# Patient Record
Sex: FEMALE | Race: WHITE | NOT HISPANIC OR LATINO | Employment: UNEMPLOYED | URBAN - METROPOLITAN AREA
[De-identification: names, ages, dates, MRNs, and addresses within clinical notes are randomized per-mention and may not be internally consistent; named-entity substitution may affect disease eponyms.]

---

## 2017-03-01 ENCOUNTER — DOCUMENTATION ONLY (OUTPATIENT)
Dept: INFUSION THERAPY | Facility: HOSPITAL | Age: 57
End: 2017-03-01

## 2017-03-01 NOTE — PROGRESS NOTES
: LCSW met with pt and spouse for social service aspect of chemo education. Provided overview of supportive services offered by [facility] and provided contact information. Pt presented as somewhat anxious and became tearful on several occasions. Pt states that the time leading up to treatment has been nerve wracking.  LCSW provided support and encouragement. LCSW provided pt and spouse with tour of infusion suite, answered questions and addressed concerns. Pt expressed gratitude. Pt scheduled to begin chemo on Friday of this week. LCSW to follow. Lilli Callejas LCSW

## 2017-03-03 ENCOUNTER — DOCUMENTATION ONLY (OUTPATIENT)
Dept: INFUSION THERAPY | Facility: HOSPITAL | Age: 57
End: 2017-03-03

## 2017-03-03 ENCOUNTER — INFUSION (OUTPATIENT)
Dept: INFUSION THERAPY | Facility: HOSPITAL | Age: 57
End: 2017-03-03
Attending: INTERNAL MEDICINE
Payer: COMMERCIAL

## 2017-03-03 VITALS
TEMPERATURE: 98 F | HEIGHT: 64 IN | WEIGHT: 128.63 LBS | RESPIRATION RATE: 16 BRPM | SYSTOLIC BLOOD PRESSURE: 121 MMHG | BODY MASS INDEX: 21.96 KG/M2 | DIASTOLIC BLOOD PRESSURE: 73 MMHG | HEART RATE: 80 BPM

## 2017-03-03 DIAGNOSIS — C50.911 CARCINOMA OF RIGHT BREAST: Primary | ICD-10-CM

## 2017-03-03 LAB
ALBUMIN SERPL BCP-MCNC: 4.4 G/DL
ALP SERPL-CCNC: 90 U/L
ALT SERPL W/O P-5'-P-CCNC: 36 U/L
ANION GAP SERPL CALC-SCNC: 9 MMOL/L
AST SERPL-CCNC: 24 U/L
BASOPHILS # BLD AUTO: 0.01 K/UL
BASOPHILS NFR BLD: 0.1 %
BILIRUB SERPL-MCNC: 0.5 MG/DL
BUN SERPL-MCNC: 17 MG/DL
CALCIUM SERPL-MCNC: 9.7 MG/DL
CHLORIDE SERPL-SCNC: 103 MMOL/L
CO2 SERPL-SCNC: 28 MMOL/L
CREAT SERPL-MCNC: 0.62 MG/DL
DIFFERENTIAL METHOD: ABNORMAL
EOSINOPHIL # BLD AUTO: 0 K/UL
EOSINOPHIL NFR BLD: 0 %
ERYTHROCYTE [DISTWIDTH] IN BLOOD BY AUTOMATED COUNT: 13.3 %
EST. GFR  (AFRICAN AMERICAN): >60 ML/MIN/1.73 M^2
EST. GFR  (NON AFRICAN AMERICAN): >60 ML/MIN/1.73 M^2
GLUCOSE SERPL-MCNC: 106 MG/DL
HCT VFR BLD AUTO: 36.6 %
HGB BLD-MCNC: 11.9 G/DL
LYMPHOCYTES # BLD AUTO: 1.5 K/UL
LYMPHOCYTES NFR BLD: 14.5 %
MAGNESIUM SERPL-MCNC: 2 MG/DL
MCH RBC QN AUTO: 29 PG
MCHC RBC AUTO-ENTMCNC: 32.5 %
MCV RBC AUTO: 89 FL
MONOCYTES # BLD AUTO: 0.7 K/UL
MONOCYTES NFR BLD: 6.6 %
NEUTROPHILS # BLD AUTO: 8 K/UL
NEUTROPHILS NFR BLD: 78.8 %
NRBC BLD-RTO: 0 /100 WBC
PLATELET # BLD AUTO: 301 K/UL
PMV BLD AUTO: 10.1 FL
POTASSIUM SERPL-SCNC: 4.1 MMOL/L
PROT SERPL-MCNC: 7.4 G/DL
RBC # BLD AUTO: 4.1 M/UL
SODIUM SERPL-SCNC: 140 MMOL/L
WBC # BLD AUTO: 10.15 K/UL

## 2017-03-03 PROCEDURE — 83735 ASSAY OF MAGNESIUM: CPT | Mod: PN

## 2017-03-03 PROCEDURE — 85025 COMPLETE CBC W/AUTO DIFF WBC: CPT

## 2017-03-03 PROCEDURE — 85025 COMPLETE CBC W/AUTO DIFF WBC: CPT | Mod: PN

## 2017-03-03 PROCEDURE — 25000003 PHARM REV CODE 250: Mod: PN | Performed by: INTERNAL MEDICINE

## 2017-03-03 PROCEDURE — 63600175 PHARM REV CODE 636 W HCPCS: Mod: PN | Performed by: INTERNAL MEDICINE

## 2017-03-03 PROCEDURE — 80053 COMPREHEN METABOLIC PANEL: CPT

## 2017-03-03 PROCEDURE — 83735 ASSAY OF MAGNESIUM: CPT

## 2017-03-03 PROCEDURE — 80053 COMPREHEN METABOLIC PANEL: CPT | Mod: PN

## 2017-03-03 PROCEDURE — 96413 CHEMO IV INFUSION 1 HR: CPT | Mod: PN

## 2017-03-03 PROCEDURE — 96417 CHEMO IV INFUS EACH ADDL SEQ: CPT | Mod: PN

## 2017-03-03 PROCEDURE — 96377 APPLICATON ON-BODY INJECTOR: CPT | Mod: PN

## 2017-03-03 PROCEDURE — 96367 TX/PROPH/DG ADDL SEQ IV INF: CPT | Mod: PN

## 2017-03-03 RX ORDER — HEPARIN 100 UNIT/ML
500 SYRINGE INTRAVENOUS
Status: DISCONTINUED | OUTPATIENT
Start: 2017-03-03 | End: 2017-03-03 | Stop reason: HOSPADM

## 2017-03-03 RX ORDER — SODIUM CHLORIDE 0.9 % (FLUSH) 0.9 %
10 SYRINGE (ML) INJECTION
Status: DISCONTINUED | OUTPATIENT
Start: 2017-03-03 | End: 2017-03-03 | Stop reason: HOSPADM

## 2017-03-03 RX ADMIN — CYCLOPHOSPHAMIDE 980 MG: 1 INJECTION, POWDER, FOR SOLUTION INTRAVENOUS; ORAL at 11:03

## 2017-03-03 RX ADMIN — SODIUM CHLORIDE 150 MG: 9 INJECTION, SOLUTION INTRAVENOUS at 09:03

## 2017-03-03 RX ADMIN — PALONOSETRON HYDROCHLORIDE 0.25 MG: 0.25 INJECTION INTRAVENOUS at 09:03

## 2017-03-03 RX ADMIN — PEGFILGRASTIM 6 MG: KIT SUBCUTANEOUS at 01:03

## 2017-03-03 RX ADMIN — SODIUM CHLORIDE: 0.9 INJECTION, SOLUTION INTRAVENOUS at 09:03

## 2017-03-03 RX ADMIN — SODIUM CHLORIDE, PRESERVATIVE FREE 10 ML: 5 INJECTION INTRAVENOUS at 01:03

## 2017-03-03 RX ADMIN — HEPARIN 500 UNITS: 100 SYRINGE at 01:03

## 2017-03-03 RX ADMIN — DOCETAXEL 120 MG: 20 INJECTION, SOLUTION, CONCENTRATE INTRAVENOUS at 10:03

## 2017-03-03 NOTE — PROGRESS NOTES
Nutrition: Met with pt and pts  today for chemo new patient education in the chair. Pt did not complete a nutrition screen however she is not currently a nutritional risk. Pt is eating well. PT reports minor weight gain due to not being able to exersice due to having surgery. Pt informed me that she has a good appetite. Reports eating a well varried diet. Wt: 128# Discussed the importance of weight maintenance. Discussed the importance of eaitng protein rich foods. Discussed the importance of eaitng small frequent meals. Discussed mouth care, taste changes, nausea and vomiting, and food safety. Pt verbalized understanding of discussed information. Will follow up at cycle 2. Karis Zavala,MS, RD, LDN

## 2017-03-03 NOTE — PLAN OF CARE
Problem: Patient Care Overview  Goal: Plan of Care Review  Outcome: Ongoing (interventions implemented as appropriate)  Tolerated 1st chemo infusion without any difficulty; RTC in 3 weeks for next tx; Amb off unit independently with spouse

## 2017-03-03 NOTE — MR AVS SNAPSHOT
Patient Information     Patient Name Sex Micheline Hernandez Female 1960      Visit Information        Provider Department [facility] Phone [facility]    3/3/2017 9:30 AM CHAIR PAWEL Pettit [facility] [phone] [facility]      Patient Instructions    Take zofran (ondansetron)  every 8 hours and compazine (prochlorperazine) every 6 hours.  You can take zofran and compazine within an hour or 2 of each other but zofran must be 6-7 hours after zofran and compazine must be 5-6 hours after compazine.  In other words, do not take the same drug too close together.          Resources for People with Cancer    You cant fight cancer alone. Reach out. Seek support from family, friends, and others who care about you. Let other people assist you. It can help you feel better both during and after your treatment.  Support groups  When you have cancer, support groups can be a great help. Meeting and talking with others with cancer can help you cope. There are also support groups for families of people with cancer. To find a support group, start by talking to your hospitals patient education department. Ask your healthcare provider. You can also do a search for cancer support groups on the Internet.  For more information  Contact the sources below for more information about cancer and cancer support groups:  · American Cancer Society (ACS)  140.938.4927  www.cancer.org  · National Cancer Onemo  871.731.8573  www.cancer.gov  Local resources  Ask your healthcare provider about your local ACS or other support groups:  _____________________________________________________________________  _____________________________________________________________________  _____________________________________________________________________  Date Last Reviewed: 2016  © 0744-1182 [employer]. [address] [place] [address]. All rights reserved. This information is not intended as  "a substitute for professional medical care. Always follow your healthcare professional's instructions.        How to Control Nausea and Vomiting     Taken before meals, medicines can help ease nausea.    Nausea is feeling that you need to throw up. Throwing up occurs when your body forces food that is in your stomach out through your mouth. Nausea and vomiting are symptoms that are caused by many things. They can happen when a condition or disease, medicine, medical treatment, or a poisonous substance affects the area in your brain that controls vomiting. Some conditions or diseases can cause nausea, abdominal pain or cramps, and vomiting. The symptoms can be mild and go away by themselves. Other symptoms can be serious. You will need to see your healthcare provider for these.  Nausea and vomiting are common. They can be caused by many things. These include:  · "Stomach flu" (gastroenteritis)  · Food poisoning  · Stomach pain (gastritis)  · Blockages  They can also be caused by a head injury, an infection in the brain or inside the ear, or migraines. Other common causes of nausea and vomiting include:  · Brain tumor  · Brain bruise  · Motion sickness  · Drugs. These include alcohol, pain medicines such as morphine, and cancer medicines.  · Toxins. These are poisonous things like plants or liquids that are swallowed by accident.  · Advanced types of cancer  · Movement problems (psychogenic problems)  · Extra pressure in the fluid that surrounds the brain and spinal cord (elevated intracranial pressure)     Nausea and vomiting are also common side effects of chemotherapy and radiation therapy. Side effects happen when treatment changes some normal cells as well as cancer cells. In this case, the cells lining your stomach and the part of your brain that controls vomiting are affected. Other more serious causes of vomiting may be hard to find early in the illness.     When to seek medical advice  Call your healthcare " provider right away if you have the following:  · Nausea or vomiting that lasts 24 hours or more  · Trouble keeping fluids down   Medicines can help  Nausea or vomiting can often be prevented or controlled with medicines (antiemetics). Your doctor may give you antiemetics before or after treatment if you are getting chemotherapy or other medical treatments that cause nausea or vomiting.  Eating tips  · If you have medicines to control nausea, take them before meals as directed.  · Avoid fatty or greasy foods while nauseated.  · Eat small meals slowly throughout the day.  · Ask someone to sit with you while you eat to keep you from thinking about feeling nauseated.  · Eat foods at room temperature or colder to avoid strong smells.  · Eat dry foods, such as toast, crackers, or pretzels. Also eat cool, light foods, such as applesauce, and bland foods, such as oatmeal or skinned chicken.   Other ways to feel better  · Get a little fresh air. Take a short walk.  · Talk to a friend, listen to music, or watch TV.  · Take a few deep, slow breaths.  · Eat by candlelight or in surroundings that you find relaxing.  · Use a technique, such as guided imagery, to help you relax. Imagine yourself in a beautiful, restful scene. Or daydream about the place youd most like to be.  Date Last Reviewed: 1/6/2016  © 0847-6905 [employer]. [address] [place] [address], [place] [address]. All rights reserved. This information is not intended as a substitute for professional medical care. Always follow your healthcare professional's instructions.        Preventing Falls in the Home  As you get older, falls are more likely for many reasons. One reason is because your reaction time slows. Your muscles and joints may also get stiffer, making them less flexible and therefore more prone to injury. Illness, medicines, and vision changes can also affect your balance. This increases your risk of falling. A fall could leave you unable to  live on your own. To make your home safer, follow these tips:   Floors  Make floors safer by doing the following:   · Put nonskid pads under area rugs.  · Remove throw rugs.  · Replace worn floor coverings.  · Tack carpets firmly to each step on carpeted stairs. Put nonskid strips on the edges of uncarpeted stairs.  · Keep floors and stairs free of clutter and cords.  · Arrange furniture so there are clear pathways.  · Clean up any spills right away.  Bathrooms  Make bathrooms safer by doing the following:   ·   Install grab bars in the tub or shower.  · Apply nonskid strips or put a nonskid rubber mat in the tub or shower.  · Sit on a bath chair to bathe.  · Use bathmats with nonskid backing.  Lighting  Tips to light your way:   · Keep a flashlight in each room.  · Put a nightlight along the pathway between the bedroom and the bathroom.  Date Last Reviewed: 8/1/2016  © 3653-7741 [employer]. [address] [place] [address]. All rights reserved. This information is not intended as a substitute for professional medical care. Always follow your healthcare professional's instructions.        Oncology: Controlling Diarrhea  Diarrhea (loose stools) is a common side effect of chemotherapy and radiation therapy. Diarrhea results when treatment affects the normal cells lining the intestine. To help limit this problem, try the tips on this handout.     For breakfast, try eating toasted white bread and a banana.   Tips for controlling diarrhea  · Limit the amount of fiber in your diet. Avoid high-fiber foods such as whole-grain bread and brown rice. Instead, eat white bread and rice.  · Eat foods rich in potassium such as bananas and oranges. This can help replenish electrolytes lost due to diarrhea.  · Eat small, frequent meals.  · Drink plenty of fluids. Clear liquids and flat light sodas, such as ginger ale, are best. This can help replenish fluids lost due to diarrhea, and prevent  dehydration.  · Do not drink coffee, tea, or alcohol.  · Try not to eat foods that are fried, greasy, spicy, or sweet.  · Limit the amount of milk you drink.  Medicines can help  Ongoing diarrhea is not something you have to live with. Talk with your doctor. Medicine to stop diarrhea may be prescribed. Your doctor may also suggest using an ointment to soothe irritation. Keeping the anal area clean helps as well. Do not take any over-the-counter product without asking your doctor first.  When to see your healthcare provider  See your healthcare provider if any of the following occur:  · The diarrhea lasts more than 48 hours.  · There is blood in your stool.  · You have pain in your abdomen.   Date Last Reviewed: 1/3/2016  © 3792-4664 [employer]. [address] [place] [address], [place] [address]. All rights reserved. This information is not intended as a substitute for professional medical care. Always follow your healthcare professional's instructions.        Oncology: Controlling Constipation  Constipation (difficulty passing stool) is a common side effect of chemotherapy and pain medicine. Constipation can be caused by other medications you are taking. It can also occur if youre not getting enough exercise and nourishment. Fluids are important in managing constipation.    3 steps to help treat constipation  Step 1. Drink plenty of fluids  Water, prune juice, and warm drinks are good choices.  Step 2. Eat high-fiber foods  Whole grains, fruit, and vegetables all can help prevent constipation, by increasing stool bulk, making it easier to pass.  Step 3. Exercise often  Taking a short walk each day is a good way to start. Check with your doctor before starting an exercise program.  When to see your healthcare provider  Contact your doctor if:  · You have no bowel movement in 3 days or more, especially if you are taking vinca alkaloids (vinblastine, vincristine, or vinorelbine).  · You are vomiting.  · You have  pain in your abdomen.   Medicines can also help  Ask your doctor about the medicines used to manage constipation. Your provider may prescribe a stool softener or laxative. These can relieve constipation by helping you have a bowel movement. Don't take any over-the-counter product without asking your doctor first.  Date Last Reviewed: 1/3/2016  © 4059-4088 [employer]. [address] [place] [address], [place] [address]. All rights reserved. This information is not intended as a substitute for professional medical care. Always follow your healthcare professional's instructions.        Managing Fatigue     Family members can help with meals and chores around the house.   Fatigue is common. It can be caused by worry, lack of sleep, or poor appetite. Fatigue can also be a sign of anemia, a shortage of red blood cells. You might need medical treatment for anemia. The tips below can help you feel better.  Conserving energy  · Keep track of the times of day when you are most tired and plan around them. For instance, if you are more tired in the afternoon, try to get tasks done in the morning.  · Decide which tasks are most important. Do those first.  · Pass tasks along to others when you need to. Ask for help.  · Accept help when its offered. Tell people what they can do to help. For instance, you may need someone to fix a meal, fold clothes, or put gas in your car.  · Plan rest times. You may want to take a nap each day. Just sitting quietly for a few minutes can make you feel more rested.  What you can do to feel better  · Relax before you try to sleep. Take a bath or read for a while.  · Form a sleep pattern. Go to bed at the same time each night and get up at the same time each morning.  · Eat well. Choose foods from all of the food groups each day.  · Exercise. Take a brisk walk to help increase your energy.  · Avoid caffeine and alcohol. Drink plenty of water or fruit juices instead.  Treating anemia  If you  begin to feel more tired than normal, tell your doctor. Fatigue could be a sign of anemia. This problem is fairly common in cancer patients, especially during chemotherapy and radiation treatments. If your red blood cell count is too low, you may get a blood transfusion. In some cases, you may need medicine to increase the number of red blood cells your body makes.  When to call your healthcare provider  Call your healthcare provider if you have:  · Shortness of breath or chest pain  · A dizzy feeling when you get up from lying or sitting down  · Paler skin than normal  · Extreme tiredness that is not helped by sleep   Date Last Reviewed: 1/3/2016  © 7045-4995 [employer]. [address] [place] [address], [place] [address]. All rights reserved. This information is not intended as a substitute for professional medical care. Always follow your healthcare professional's instructions.             Your Current Medications Are     cefadroxil (DURICEF) 500 MG Cap    dexamethasone (DECADRON) 4 MG Tab    hydrocodone-acetaminophen 10-325mg (NORCO)  mg Tab    lidocaine-prilocaine (EMLA) cream    lorazepam (ATIVAN) 0.5 MG tablet    mupirocin calcium 2% nasl oint (BACTROBAN) 2 % Oint    netupitant-palonosetron 300-0.5 mg Cap    ondansetron (ZOFRAN-ODT) 8 MG TbDL    prochlorperazine (COMPAZINE) 10 MG tablet    tramadol (ULTRAM) 50 mg tablet      Facility-Administered Medications     alteplase injection 2 mg    cyclophosphamide (CYTOXAN) 600 mg/m2 = 980 mg in sodium chloride 0.9% 250 mL chemo infusion    docetaxel (TAXOTERE) 75 mg/m2 = 120 mg in sodium chloride 0.9% 250 mL chemo infusion    fosaprepitant 150 mg in sodium chloride 0.9% 150 mL IVPB    heparin, porcine (PF) 100 unit/mL injection flush 500 Units    palonosetron 0.25mg/dexamethasone 10mg IVPB    pegfilgrastim (NEULASTA (ON BODY INJECTOR)) injection 6 mg    sodium chloride 0.9% 250 mL flush bag    sodium chloride 0.9% flush 10 mL      Appointments for Next  Year     3/6/2017  3:30 PM INFUSION 030 MIN (30 min.) [facility] CHAIR 10, [facility]    Arrive at check-in approximately 15 minutes before your scheduled appointment time. Bring all outside medical records and imaging, along with a list of your current medications and insurance card.    1st Floor    3/10/2017  2:30 PM ESTABLISHED PATIENT (30 min.) [facility] ZEN Bazan    Arrive at check-in approximately 15 minutes before your scheduled appointment time. Bring all outside medical records and imaging, along with a list of your current medications and insurance card.    3/16/2017 10:00 AM ESTABLISHED PATIENT (30 min.) [facility] ZEN Bazan    Arrive at check-in approximately 15 minutes before your scheduled appointment time. Bring all outside medical records and imaging, along with a list of your current medications and insurance card.    3/22/2017  9:15 AM ESTABLISHED PATIENT (15 min.) [facility] Corby Griffiths MD    Arrive at check-in approximately 15 minutes before your scheduled appointment time. Bring all outside medical records and imaging, along with a list of your current medications and insurance card.    3/22/2017  9:30 AM INFUSION 300 MIN (300 min.) [facility] CHAIR 26, [facility]    Arrive at check-in approximately 15 minutes before your scheduled appointment time. Bring all outside medical records and imaging, along with a list of your current medications and insurance card.    1st Floor    3/23/2017  2:30 PM INFUSION 030 MIN (30 min.) [facility] CHAIR 14, [facility]    Arrive at check-in approximately 15 minutes before your scheduled appointment time. Bring all outside medical records and imaging, along with a list of your current medications and insurance card.    1st Floor         Default Flowsheet Data (last 24 hours)      Amb  "Complex Vitals [facility]        03/03/17 1331 03/03/17 0848             Measurements    Weight  58.3 kg (128 lb 9.6 oz)       Height  5' 4" (1.626 m)       BSA (Calculated - sq m)  1.62 sq meters       BMI (Calculated)  22.1       /73 117/73       Temp  98.2 °F (36.8 °C)       Pulse 80 77       Resp  16       Pain Assessment    Pain Score  Zero               Allergies     No Known Allergies      Medications You Received from 03/02/2017 1333 to 03/03/2017 1333        Date/Time Order Dose Route Action     03/03/2017 1152 cyclophosphamide (CYTOXAN) 600 mg/m2 = 980 mg in sodium chloride 0.9% 250 mL chemo infusion 980 mg Intravenous New Bag     03/03/2017 1039 docetaxel (TAXOTERE) 75 mg/m2 = 120 mg in sodium chloride 0.9% 250 mL chemo infusion 120 mg Intravenous New Bag     03/03/2017 0950 fosaprepitant 150 mg in sodium chloride 0.9% 150 mL IVPB 150 mg Intravenous New Bag     03/03/2017 1330 heparin, porcine (PF) 100 unit/mL injection flush 500 Units 500 Units Intravenous Given     03/03/2017 0910 palonosetron 0.25mg/dexamethasone 10mg IVPB 0.25 mg Intravenous New Bag     03/03/2017 1300 pegfilgrastim (NEULASTA (ON BODY INJECTOR)) injection 6 mg 6 mg Subcutaneous Given     03/03/2017 0910 sodium chloride 0.9% 250 mL flush bag   Intravenous New Bag     03/03/2017 1330 sodium chloride 0.9% flush 10 mL 10 mL Intravenous Given      Current Discharge Medication List     Cannot display discharge medications since this is not an admission.      "

## 2017-03-03 NOTE — PATIENT INSTRUCTIONS
Take zofran (ondansetron)  every 8 hours and compazine (prochlorperazine) every 6 hours.  You can take zofran and compazine within an hour or 2 of each other but zofran must be 6-7 hours after zofran and compazine must be 5-6 hours after compazine.  In other words, do not take the same drug too close together.          Resources for People with Cancer    You cant fight cancer alone. Reach out. Seek support from family, friends, and others who care about you. Let other people assist you. It can help you feel better both during and after your treatment.  Support groups  When you have cancer, support groups can be a great help. Meeting and talking with others with cancer can help you cope. There are also support groups for families of people with cancer. To find a support group, start by talking to your hospitals patient education department. Ask your healthcare provider. You can also do a search for cancer support groups on the Internet.  For more information  Contact the sources below for more information about cancer and cancer support groups:  · American Cancer Society (ACS)  533.708.5919  www.cancer.org  · National Cancer Axis  627.508.2467  www.cancer.gov  Local resources  Ask your healthcare provider about your local ACS or other support groups:  _____________________________________________________________________  _____________________________________________________________________  _____________________________________________________________________  Date Last Reviewed: 1/6/2016  © 7398-4590 [employer]. [address] [place] [address], [place] [address]. All rights reserved. This information is not intended as a substitute for professional medical care. Always follow your healthcare professional's instructions.        How to Control Nausea and Vomiting     Taken before meals, medicines can help ease nausea.    Nausea is feeling that you need to throw up. Throwing up occurs when your body  "forces food that is in your stomach out through your mouth. Nausea and vomiting are symptoms that are caused by many things. They can happen when a condition or disease, medicine, medical treatment, or a poisonous substance affects the area in your brain that controls vomiting. Some conditions or diseases can cause nausea, abdominal pain or cramps, and vomiting. The symptoms can be mild and go away by themselves. Other symptoms can be serious. You will need to see your healthcare provider for these.  Nausea and vomiting are common. They can be caused by many things. These include:  · "Stomach flu" (gastroenteritis)  · Food poisoning  · Stomach pain (gastritis)  · Blockages  They can also be caused by a head injury, an infection in the brain or inside the ear, or migraines. Other common causes of nausea and vomiting include:  · Brain tumor  · Brain bruise  · Motion sickness  · Drugs. These include alcohol, pain medicines such as morphine, and cancer medicines.  · Toxins. These are poisonous things like plants or liquids that are swallowed by accident.  · Advanced types of cancer  · Movement problems (psychogenic problems)  · Extra pressure in the fluid that surrounds the brain and spinal cord (elevated intracranial pressure)     Nausea and vomiting are also common side effects of chemotherapy and radiation therapy. Side effects happen when treatment changes some normal cells as well as cancer cells. In this case, the cells lining your stomach and the part of your brain that controls vomiting are affected. Other more serious causes of vomiting may be hard to find early in the illness.     When to seek medical advice  Call your healthcare provider right away if you have the following:  · Nausea or vomiting that lasts 24 hours or more  · Trouble keeping fluids down   Medicines can help  Nausea or vomiting can often be prevented or controlled with medicines (antiemetics). Your doctor may give you antiemetics before or " after treatment if you are getting chemotherapy or other medical treatments that cause nausea or vomiting.  Eating tips  · If you have medicines to control nausea, take them before meals as directed.  · Avoid fatty or greasy foods while nauseated.  · Eat small meals slowly throughout the day.  · Ask someone to sit with you while you eat to keep you from thinking about feeling nauseated.  · Eat foods at room temperature or colder to avoid strong smells.  · Eat dry foods, such as toast, crackers, or pretzels. Also eat cool, light foods, such as applesauce, and bland foods, such as oatmeal or skinned chicken.   Other ways to feel better  · Get a little fresh air. Take a short walk.  · Talk to a friend, listen to music, or watch TV.  · Take a few deep, slow breaths.  · Eat by candlelight or in surroundings that you find relaxing.  · Use a technique, such as guided imagery, to help you relax. Imagine yourself in a beautiful, restful scene. Or daydream about the place youd most like to be.  Date Last Reviewed: 1/6/2016  © 3421-5439 [employer]. [address] [place] [address]. All rights reserved. This information is not intended as a substitute for professional medical care. Always follow your healthcare professional's instructions.        Preventing Falls in the Home  As you get older, falls are more likely for many reasons. One reason is because your reaction time slows. Your muscles and joints may also get stiffer, making them less flexible and therefore more prone to injury. Illness, medicines, and vision changes can also affect your balance. This increases your risk of falling. A fall could leave you unable to live on your own. To make your home safer, follow these tips:   Floors  Make floors safer by doing the following:   · Put nonskid pads under area rugs.  · Remove throw rugs.  · Replace worn floor coverings.  · Tack carpets firmly to each step on carpeted stairs. Put nonskid strips on  the edges of uncarpeted stairs.  · Keep floors and stairs free of clutter and cords.  · Arrange furniture so there are clear pathways.  · Clean up any spills right away.  Bathrooms  Make bathrooms safer by doing the following:   ·   Install grab bars in the tub or shower.  · Apply nonskid strips or put a nonskid rubber mat in the tub or shower.  · Sit on a bath chair to bathe.  · Use bathmats with nonskid backing.  Lighting  Tips to light your way:   · Keep a flashlight in each room.  · Put a nightlight along the pathway between the bedroom and the bathroom.  Date Last Reviewed: 8/1/2016 © 2000-2016 [employer]. [address] [place] [address], [place] [address]. All rights reserved. This information is not intended as a substitute for professional medical care. Always follow your healthcare professional's instructions.        Oncology: Controlling Diarrhea  Diarrhea (loose stools) is a common side effect of chemotherapy and radiation therapy. Diarrhea results when treatment affects the normal cells lining the intestine. To help limit this problem, try the tips on this handout.     For breakfast, try eating toasted white bread and a banana.   Tips for controlling diarrhea  · Limit the amount of fiber in your diet. Avoid high-fiber foods such as whole-grain bread and brown rice. Instead, eat white bread and rice.  · Eat foods rich in potassium such as bananas and oranges. This can help replenish electrolytes lost due to diarrhea.  · Eat small, frequent meals.  · Drink plenty of fluids. Clear liquids and flat light sodas, such as ginger ale, are best. This can help replenish fluids lost due to diarrhea, and prevent dehydration.  · Do not drink coffee, tea, or alcohol.  · Try not to eat foods that are fried, greasy, spicy, or sweet.  · Limit the amount of milk you drink.  Medicines can help  Ongoing diarrhea is not something you have to live with. Talk with your doctor. Medicine to stop diarrhea may be  prescribed. Your doctor may also suggest using an ointment to soothe irritation. Keeping the anal area clean helps as well. Do not take any over-the-counter product without asking your doctor first.  When to see your healthcare provider  See your healthcare provider if any of the following occur:  · The diarrhea lasts more than 48 hours.  · There is blood in your stool.  · You have pain in your abdomen.   Date Last Reviewed: 1/3/2016  © 6190-7429 [employer]. [address] [place] [address], [place] [address]. All rights reserved. This information is not intended as a substitute for professional medical care. Always follow your healthcare professional's instructions.        Oncology: Controlling Constipation  Constipation (difficulty passing stool) is a common side effect of chemotherapy and pain medicine. Constipation can be caused by other medications you are taking. It can also occur if youre not getting enough exercise and nourishment. Fluids are important in managing constipation.    3 steps to help treat constipation  Step 1. Drink plenty of fluids  Water, prune juice, and warm drinks are good choices.  Step 2. Eat high-fiber foods  Whole grains, fruit, and vegetables all can help prevent constipation, by increasing stool bulk, making it easier to pass.  Step 3. Exercise often  Taking a short walk each day is a good way to start. Check with your doctor before starting an exercise program.  When to see your healthcare provider  Contact your doctor if:  · You have no bowel movement in 3 days or more, especially if you are taking vinca alkaloids (vinblastine, vincristine, or vinorelbine).  · You are vomiting.  · You have pain in your abdomen.   Medicines can also help  Ask your doctor about the medicines used to manage constipation. Your provider may prescribe a stool softener or laxative. These can relieve constipation by helping you have a bowel movement. Don't take any over-the-counter product without  asking your doctor first.  Date Last Reviewed: 1/3/2016  © 8365-2419 [employer]. [address] [place] [address], [place] [address]. All rights reserved. This information is not intended as a substitute for professional medical care. Always follow your healthcare professional's instructions.        Managing Fatigue     Family members can help with meals and chores around the house.   Fatigue is common. It can be caused by worry, lack of sleep, or poor appetite. Fatigue can also be a sign of anemia, a shortage of red blood cells. You might need medical treatment for anemia. The tips below can help you feel better.  Conserving energy  · Keep track of the times of day when you are most tired and plan around them. For instance, if you are more tired in the afternoon, try to get tasks done in the morning.  · Decide which tasks are most important. Do those first.  · Pass tasks along to others when you need to. Ask for help.  · Accept help when its offered. Tell people what they can do to help. For instance, you may need someone to fix a meal, fold clothes, or put gas in your car.  · Plan rest times. You may want to take a nap each day. Just sitting quietly for a few minutes can make you feel more rested.  What you can do to feel better  · Relax before you try to sleep. Take a bath or read for a while.  · Form a sleep pattern. Go to bed at the same time each night and get up at the same time each morning.  · Eat well. Choose foods from all of the food groups each day.  · Exercise. Take a brisk walk to help increase your energy.  · Avoid caffeine and alcohol. Drink plenty of water or fruit juices instead.  Treating anemia  If you begin to feel more tired than normal, tell your doctor. Fatigue could be a sign of anemia. This problem is fairly common in cancer patients, especially during chemotherapy and radiation treatments. If your red blood cell count is too low, you may get a blood transfusion. In some cases, you  may need medicine to increase the number of red blood cells your body makes.  When to call your healthcare provider  Call your healthcare provider if you have:  · Shortness of breath or chest pain  · A dizzy feeling when you get up from lying or sitting down  · Paler skin than normal  · Extreme tiredness that is not helped by sleep   Date Last Reviewed: 1/3/2016  © 6339-0509 [employer]. [address] [place] [address], [place] [address]. All rights reserved. This information is not intended as a substitute for professional medical care. Always follow your healthcare professional's instructions.

## 2017-03-22 ENCOUNTER — INFUSION (OUTPATIENT)
Dept: INFUSION THERAPY | Facility: HOSPITAL | Age: 57
End: 2017-03-22
Attending: INTERNAL MEDICINE
Payer: COMMERCIAL

## 2017-03-22 ENCOUNTER — TELEPHONE (OUTPATIENT)
Dept: INFUSION THERAPY | Facility: HOSPITAL | Age: 57
End: 2017-03-22

## 2017-03-22 VITALS
HEART RATE: 86 BPM | RESPIRATION RATE: 16 BRPM | SYSTOLIC BLOOD PRESSURE: 126 MMHG | WEIGHT: 132.19 LBS | TEMPERATURE: 98 F | BODY MASS INDEX: 22.69 KG/M2 | DIASTOLIC BLOOD PRESSURE: 68 MMHG

## 2017-03-22 DIAGNOSIS — C50.911 CARCINOMA OF RIGHT BREAST: Primary | ICD-10-CM

## 2017-03-22 PROCEDURE — 25000003 PHARM REV CODE 250: Mod: PN | Performed by: NURSE PRACTITIONER

## 2017-03-22 PROCEDURE — 63600175 PHARM REV CODE 636 W HCPCS: Mod: PN | Performed by: NURSE PRACTITIONER

## 2017-03-22 PROCEDURE — 96413 CHEMO IV INFUSION 1 HR: CPT | Mod: PN

## 2017-03-22 PROCEDURE — 96377 APPLICATON ON-BODY INJECTOR: CPT | Mod: PN

## 2017-03-22 PROCEDURE — 96417 CHEMO IV INFUS EACH ADDL SEQ: CPT | Mod: PN

## 2017-03-22 PROCEDURE — 96367 TX/PROPH/DG ADDL SEQ IV INF: CPT | Mod: PN

## 2017-03-22 RX ORDER — SODIUM CHLORIDE 0.9 % (FLUSH) 0.9 %
10 SYRINGE (ML) INJECTION
Status: DISCONTINUED | OUTPATIENT
Start: 2017-03-22 | End: 2017-03-22 | Stop reason: HOSPADM

## 2017-03-22 RX ORDER — HEPARIN 100 UNIT/ML
500 SYRINGE INTRAVENOUS
Status: DISCONTINUED | OUTPATIENT
Start: 2017-03-22 | End: 2017-03-22 | Stop reason: HOSPADM

## 2017-03-22 RX ADMIN — PEGFILGRASTIM 6 MG: KIT SUBCUTANEOUS at 12:03

## 2017-03-22 RX ADMIN — CYCLOPHOSPHAMIDE 980 MG: 1 INJECTION, POWDER, FOR SOLUTION INTRAVENOUS; ORAL at 11:03

## 2017-03-22 RX ADMIN — DOCETAXEL 120 MG: 20 INJECTION, SOLUTION, CONCENTRATE INTRAVENOUS at 10:03

## 2017-03-22 RX ADMIN — SODIUM CHLORIDE: 0.9 INJECTION, SOLUTION INTRAVENOUS at 09:03

## 2017-03-22 RX ADMIN — SODIUM CHLORIDE, PRESERVATIVE FREE 10 ML: 5 INJECTION INTRAVENOUS at 09:03

## 2017-03-22 RX ADMIN — SODIUM CHLORIDE 150 MG: 9 INJECTION, SOLUTION INTRAVENOUS at 10:03

## 2017-03-22 RX ADMIN — PALONOSETRON HYDROCHLORIDE 0.25 MG: 0.25 INJECTION INTRAVENOUS at 09:03

## 2017-03-22 NOTE — MR AVS SNAPSHOT
Patient Information     Patient Name Sex Micheline Hernandez Female 1960      Visit Information        Provider Department Dep Phone Center    3/22/2017 9:30 AM CHAIR 14, [facility] [phone] [facility]      Patient Instructions     None      Your Current Medications Are     cefadroxil (DURICEF) 500 MG Cap    dexamethasone (DECADRON) 4 MG Tab    hydrocodone-acetaminophen 10-325mg (NORCO)  mg Tab    lidocaine-prilocaine (EMLA) cream    lorazepam (ATIVAN) 0.5 MG tablet    mupirocin calcium 2% nasl oint (BACTROBAN) 2 % Oint    netupitant-palonosetron 300-0.5 mg Cap    ondansetron (ZOFRAN-ODT) 8 MG TbDL    prochlorperazine (COMPAZINE) 10 MG tablet    tramadol (ULTRAM) 50 mg tablet      Facility-Administered Medications     alteplase injection 2 mg    cyclophosphamide (CYTOXAN) 600 mg/m2 = 980 mg in sodium chloride 0.9% 250 mL chemo infusion    docetaxel (TAXOTERE) 75 mg/m2 = 120 mg in sodium chloride 0.9% 250 mL chemo infusion    fosaprepitant 150 mg in sodium chloride 0.9% 150 mL IVPB    heparin, porcine (PF) 100 unit/mL injection flush 500 Units    palonosetron 0.25mg/dexamethasone 10mg IVPB    pegfilgrastim (NEULASTA (ON BODY INJECTOR)) injection 6 mg    sodium chloride 0.9% 250 mL flush bag    sodium chloride 0.9% flush 10 mL      Appointments for Next Year     2017  9:30 AM INFUSION 300 MIN (300 min.) [facility]    Arrive at check-in approximately 15 minutes before your scheduled appointment time. Bring all outside medical records and imaging, along with a list of your current medications and insurance card.    1st Floor    5/3/2017  9:30 AM INFUSION 300 MIN (300 min.) [facility] CHAIR 06, [facility] CHEMO    Arrive at check-in approximately 15 minutes before your scheduled appointment time. Bring all outside medical records and imaging, along with a list of your current medications and  insurance card.    1st Floor         Default Flowsheet Data (last 24 hours)      Amb Complex Vitals Bebeto        03/22/17 1300 03/22/17 0930 03/22/17 0930 03/22/17 0922       Measurements    Weight   59.9 kg (132 lb) 60 kg (132 lb 3.2 oz)     /68  127/60 127/60     Temp 97.9 °F (36.6 °C)  97.9 °F (36.6 °C) 97.9 °F (36.6 °C)     Pulse 86  89 89     Resp 16  16 16     Pain Assessment    Pain Score  Zero               Allergies     No Known Allergies      Medications You Received from 03/21/2017 1305 to 03/22/2017 1305        Date/Time Order Dose Route Action     03/22/2017 1140 cyclophosphamide (CYTOXAN) 600 mg/m2 = 980 mg in sodium chloride 0.9% 250 mL chemo infusion 980 mg Intravenous New Bag     03/22/2017 1038 docetaxel (TAXOTERE) 75 mg/m2 = 120 mg in sodium chloride 0.9% 250 mL chemo infusion 120 mg Intravenous New Bag     03/22/2017 1003 fosaprepitant 150 mg in sodium chloride 0.9% 150 mL IVPB 150 mg Intravenous New Bag     03/22/2017 0943 palonosetron 0.25mg/dexamethasone 10mg IVPB 0.25 mg Intravenous New Bag     03/22/2017 1255 pegfilgrastim (NEULASTA (ON BODY INJECTOR)) injection 6 mg 6 mg Subcutaneous Given     03/22/2017 0943 sodium chloride 0.9% 250 mL flush bag   Intravenous New Bag     03/22/2017 0943 sodium chloride 0.9% flush 10 mL 10 mL Intravenous Given      Current Discharge Medication List     Cannot display discharge medications since this is not an admission.

## 2017-03-22 NOTE — PLAN OF CARE
Problem: Patient Care Overview  Goal: Plan of Care Review  Outcome: Ongoing (interventions implemented as appropriate)  Pt tolerated chemotherapy infusion without any difficulty. Instructed pt to call for questions or concerns and to return to clinic as directed for next treatment. Pt verbalized understanding. AVS printed and schedule given to pt

## 2017-03-22 NOTE — TELEPHONE ENCOUNTER
needs f/u with alvaro  Received: Today       DIDIER Cook       Cc: Aziza Watson MA; Payton Rod RN; Alma Delia Liu RN                     Pt saw dr kwong in chair today and needs f/u appt with alvaro in one week. Please call pt with appt time and day thanks

## 2017-03-23 ENCOUNTER — DOCUMENTATION ONLY (OUTPATIENT)
Dept: INFUSION THERAPY | Facility: HOSPITAL | Age: 57
End: 2017-03-23

## 2017-03-23 NOTE — PROGRESS NOTES
Nutrition: Met with pt and pts  on 3/22/2017 while pt was having chemo infusion #2. Pt informed me that she is eating well. Denies issues or concerns after cycle 1. Wt: 132# Weight stable. Will assist if and when needed. Karis Zavala,MS, RD, LDN

## 2017-04-13 ENCOUNTER — INFUSION (OUTPATIENT)
Dept: INFUSION THERAPY | Facility: HOSPITAL | Age: 57
End: 2017-04-13
Attending: INTERNAL MEDICINE
Payer: COMMERCIAL

## 2017-04-13 VITALS
RESPIRATION RATE: 16 BRPM | HEART RATE: 72 BPM | SYSTOLIC BLOOD PRESSURE: 105 MMHG | TEMPERATURE: 98 F | DIASTOLIC BLOOD PRESSURE: 55 MMHG | WEIGHT: 130.69 LBS | BODY MASS INDEX: 22.31 KG/M2 | HEIGHT: 64 IN

## 2017-04-13 DIAGNOSIS — C50.911 CARCINOMA OF RIGHT BREAST: Primary | ICD-10-CM

## 2017-04-13 PROCEDURE — 96377 APPLICATON ON-BODY INJECTOR: CPT | Mod: PN

## 2017-04-13 PROCEDURE — 63600175 PHARM REV CODE 636 W HCPCS: Mod: PN | Performed by: PHYSICIAN ASSISTANT

## 2017-04-13 PROCEDURE — 25000003 PHARM REV CODE 250: Mod: PN | Performed by: PHYSICIAN ASSISTANT

## 2017-04-13 PROCEDURE — 96417 CHEMO IV INFUS EACH ADDL SEQ: CPT | Mod: PN

## 2017-04-13 PROCEDURE — 96367 TX/PROPH/DG ADDL SEQ IV INF: CPT | Mod: PN

## 2017-04-13 PROCEDURE — 96413 CHEMO IV INFUSION 1 HR: CPT | Mod: PN

## 2017-04-13 RX ORDER — SODIUM CHLORIDE 0.9 % (FLUSH) 0.9 %
10 SYRINGE (ML) INJECTION
Status: DISCONTINUED | OUTPATIENT
Start: 2017-04-13 | End: 2017-04-13 | Stop reason: HOSPADM

## 2017-04-13 RX ADMIN — CYCLOPHOSPHAMIDE 980 MG: 1 INJECTION, POWDER, FOR SOLUTION INTRAVENOUS; ORAL at 11:04

## 2017-04-13 RX ADMIN — SODIUM CHLORIDE, PRESERVATIVE FREE 10 ML: 5 INJECTION INTRAVENOUS at 09:04

## 2017-04-13 RX ADMIN — SODIUM CHLORIDE: 0.9 INJECTION, SOLUTION INTRAVENOUS at 09:04

## 2017-04-13 RX ADMIN — SODIUM CHLORIDE 150 MG: 9 INJECTION, SOLUTION INTRAVENOUS at 10:04

## 2017-04-13 RX ADMIN — DOCETAXEL 120 MG: 20 INJECTION, SOLUTION, CONCENTRATE INTRAVENOUS at 10:04

## 2017-04-13 RX ADMIN — PEGFILGRASTIM 6 MG: KIT SUBCUTANEOUS at 01:04

## 2017-04-13 RX ADMIN — PALONOSETRON HYDROCHLORIDE 0.25 MG: 0.25 INJECTION INTRAVENOUS at 09:04

## 2017-04-13 NOTE — PLAN OF CARE
Problem: Patient Care Overview  Goal: Plan of Care Review  Outcome: Ongoing (interventions implemented as appropriate)  Pt uses Cold caps to prevent hairloss SSCALLONRN

## 2017-04-13 NOTE — PLAN OF CARE
Problem: Patient Care Overview  Goal: Discharge Needs Assessment  Outcome: Ongoing (interventions implemented as appropriate)  Pt tolerated Cycle 3 well . OBI placed to Left arm. SSCALLONRN

## 2017-04-28 RX ORDER — SODIUM CHLORIDE 0.9 % (FLUSH) 0.9 %
10 SYRINGE (ML) INJECTION
Status: CANCELLED | OUTPATIENT
Start: 2017-05-08

## 2017-04-28 RX ORDER — HEPARIN 100 UNIT/ML
500 SYRINGE INTRAVENOUS
Status: CANCELLED | OUTPATIENT
Start: 2017-05-08

## 2017-05-02 ENCOUNTER — INFUSION (OUTPATIENT)
Dept: INFUSION THERAPY | Facility: HOSPITAL | Age: 57
End: 2017-05-02
Attending: INTERNAL MEDICINE
Payer: COMMERCIAL

## 2017-05-02 VITALS
RESPIRATION RATE: 16 BRPM | WEIGHT: 133.88 LBS | SYSTOLIC BLOOD PRESSURE: 111 MMHG | DIASTOLIC BLOOD PRESSURE: 67 MMHG | HEART RATE: 79 BPM | BODY MASS INDEX: 22.86 KG/M2 | HEIGHT: 64 IN

## 2017-05-02 DIAGNOSIS — C50.911 CARCINOMA OF RIGHT BREAST: Primary | ICD-10-CM

## 2017-05-02 PROCEDURE — 96377 APPLICATON ON-BODY INJECTOR: CPT | Mod: PN

## 2017-05-02 PROCEDURE — 96413 CHEMO IV INFUSION 1 HR: CPT | Mod: PN

## 2017-05-02 PROCEDURE — 25000003 PHARM REV CODE 250: Mod: PN | Performed by: PHYSICIAN ASSISTANT

## 2017-05-02 PROCEDURE — 63600175 PHARM REV CODE 636 W HCPCS: Mod: PN | Performed by: PHYSICIAN ASSISTANT

## 2017-05-02 PROCEDURE — 96367 TX/PROPH/DG ADDL SEQ IV INF: CPT | Mod: PN

## 2017-05-02 PROCEDURE — 96417 CHEMO IV INFUS EACH ADDL SEQ: CPT | Mod: PN

## 2017-05-02 RX ORDER — SODIUM CHLORIDE 0.9 % (FLUSH) 0.9 %
10 SYRINGE (ML) INJECTION
Status: DISCONTINUED | OUTPATIENT
Start: 2017-05-02 | End: 2017-05-02 | Stop reason: HOSPADM

## 2017-05-02 RX ADMIN — PEGFILGRASTIM 6 MG: KIT SUBCUTANEOUS at 02:05

## 2017-05-02 RX ADMIN — SODIUM CHLORIDE: 0.9 INJECTION, SOLUTION INTRAVENOUS at 10:05

## 2017-05-02 RX ADMIN — DOCETAXEL 120 MG: 20 INJECTION, SOLUTION, CONCENTRATE INTRAVENOUS at 11:05

## 2017-05-02 RX ADMIN — SODIUM CHLORIDE 150 MG: 9 INJECTION, SOLUTION INTRAVENOUS at 10:05

## 2017-05-02 RX ADMIN — PALONOSETRON HYDROCHLORIDE 0.25 MG: 0.25 INJECTION INTRAVENOUS at 11:05

## 2017-05-02 RX ADMIN — CYCLOPHOSPHAMIDE 980 MG: 1 INJECTION, POWDER, FOR SOLUTION INTRAVENOUS; ORAL at 12:05

## 2017-05-02 RX ADMIN — SODIUM CHLORIDE, PRESERVATIVE FREE 10 ML: 5 INJECTION INTRAVENOUS at 10:05

## 2017-05-02 NOTE — PATIENT INSTRUCTIONS
Oncology: Controlling Diarrhea  Diarrhea (loose stools) is a common side effect of chemotherapy and radiation therapy. Diarrhea results when treatment affects the normal cells lining the intestine. To help limit this problem, try the tips on this handout.     For breakfast, try eating toasted white bread and a banana.   Tips for controlling diarrhea  · Limit the amount of fiber in your diet. Avoid high-fiber foods such as whole-grain bread and brown rice. Instead, eat white bread and rice.  · Eat foods rich in potassium such as bananas and oranges. This can help replenish electrolytes lost due to diarrhea.  · Eat small, frequent meals.  · Drink plenty of fluids. Clear liquids and flat light sodas, such as ginger ale, are best. This can help replenish fluids lost due to diarrhea, and prevent dehydration.  · Do not drink coffee, tea, or alcohol.  · Try not to eat foods that are fried, greasy, spicy, or sweet.  · Limit the amount of milk you drink.  Medicines can help  Ongoing diarrhea is not something you have to live with. Talk with your doctor. Medicine to stop diarrhea may be prescribed. Your doctor may also suggest using an ointment to soothe irritation. Keeping the anal area clean helps as well. Do not take any over-the-counter product without asking your doctor first.  When to see your healthcare provider  See your healthcare provider if any of the following occur:  · The diarrhea lasts more than 48 hours.  · There is blood in your stool.  · You have pain in your abdomen.   Date Last Reviewed: 1/3/2016  © 8483-2262 [employer]. [address] [place] [address], [place] [address]. All rights reserved. This information is not intended as a substitute for professional medical care. Always follow your healthcare professional's instructions.        Nutrition and MyPlate: Protein Foods    This group includes foods that are high in protein. Protein helps the body build new cells and keeps tissues healthy. Most  Americans get enough protein without even trying. It can be harder for vegetarians, but plenty of non-meat foods are rich in protein, too. Its best to get protein from a variety of sources.  Nutrient-rich choices  There's a lot more to this food group than just meat and beans. It also includes nuts, seeds, and eggs. There are all sorts of nutrient-rich choices:  · Chicken and turkey with the skin removed  · Fish and shellfish  · Lean beef, pork, or lamb (without visible fat)  · Soy products, such as tofu, soybeans (edamame), tempeh, or soymilk  · Black beans, kidney beans, wolf beans, chickpeas (garbanzo beans), and lentils (Note: beans and peas count as both a protein and a vegetable)  · Peanuts, almonds, walnuts, sesame seeds, and sunflower seeds, as well as foods made from these (such as peanut butter or tahini)  · Eggs and foods made with eggs (such as quiche or frittata)  What makes meat and beans less healthy?  · Fatty meat is not healthy. Before you cook meat, trim off all the fat you can see. Chicken and turkey skin is also high in fat, and should be removed before cooking.  · Breading and frying make food less healthy. This includes dishes like fried chicken, fried fish, and refried beans.  · Sausage and lunch meats tend to be high in fat and salt. Buy low-fat, low-sodium versions.  One small change  Make a meal that includes a non-meat source of protein (such as tofu, lentils, or any other food listed above). Have a better idea? Write it here:  _____________________________________________________________  Date Last Reviewed: 6/25/2015  © 0685-5921 [employer]. [address] [place] [address], [place] [address]. All rights reserved. This information is not intended as a substitute for professional medical care. Always follow your healthcare professional's instructions.

## 2017-05-02 NOTE — PLAN OF CARE
Problem: Patient Care Overview  Goal: Plan of Care Review  Outcome: Ongoing (interventions implemented as appropriate)  Pt. Completed treatment, tolerated without noted distress.Vtial signs stable. Patient discharged from infusion center ambulatory with . Patient had all present questions answered.

## 2017-05-02 NOTE — MR AVS SNAPSHOT
Patient Information     Patient Name Sex Micheline Hernandez Female 1960      Visit Information        Provider Department Dept Phone Center    2017 9:30 AM CHAIR [facility] [phone] [facility]      Patient Instructions      Oncology: Controlling Diarrhea  Diarrhea (loose stools) is a common side effect of chemotherapy and radiation therapy. Diarrhea results when treatment affects the normal cells lining the intestine. To help limit this problem, try the tips on this handout.     For breakfast, try eating toasted white bread and a banana.   Tips for controlling diarrhea  · Limit the amount of fiber in your diet. Avoid high-fiber foods such as whole-grain bread and brown rice. Instead, eat white bread and rice.  · Eat foods rich in potassium such as bananas and oranges. This can help replenish electrolytes lost due to diarrhea.  · Eat small, frequent meals.  · Drink plenty of fluids. Clear liquids and flat light sodas, such as ginger ale, are best. This can help replenish fluids lost due to diarrhea, and prevent dehydration.  · Do not drink coffee, tea, or alcohol.  · Try not to eat foods that are fried, greasy, spicy, or sweet.  · Limit the amount of milk you drink.  Medicines can help  Ongoing diarrhea is not something you have to live with. Talk with your doctor. Medicine to stop diarrhea may be prescribed. Your doctor may also suggest using an ointment to soothe irritation. Keeping the anal area clean helps as well. Do not take any over-the-counter product without asking your doctor first.  When to see your healthcare provider  See your healthcare provider if any of the following occur:  · The diarrhea lasts more than 48 hours.  · There is blood in your stool.  · You have pain in your abdomen.   Date Last Reviewed: 1/3/2016  © 4226-8781 [employer]. [address] [place] [address], [place] [address]. All rights reserved. This information is not  intended as a substitute for professional medical care. Always follow your healthcare professional's instructions.        Nutrition and MyPlate: Protein Foods    This group includes foods that are high in protein. Protein helps the body build new cells and keeps tissues healthy. Most Americans get enough protein without even trying. It can be harder for vegetarians, but plenty of non-meat foods are rich in protein, too. Its best to get protein from a variety of sources.  Nutrient-rich choices  There's a lot more to this food group than just meat and beans. It also includes nuts, seeds, and eggs. There are all sorts of nutrient-rich choices:  · Chicken and turkey with the skin removed  · Fish and shellfish  · Lean beef, pork, or lamb (without visible fat)  · Soy products, such as tofu, soybeans (edamame), tempeh, or soymilk  · Black beans, kidney beans, wolf beans, chickpeas (garbanzo beans), and lentils (Note: beans and peas count as both a protein and a vegetable)  · Peanuts, almonds, walnuts, sesame seeds, and sunflower seeds, as well as foods made from these (such as peanut butter or tahini)  · Eggs and foods made with eggs (such as quiche or frittata)  What makes meat and beans less healthy?  · Fatty meat is not healthy. Before you cook meat, trim off all the fat you can see. Chicken and turkey skin is also high in fat, and should be removed before cooking.  · Breading and frying make food less healthy. This includes dishes like fried chicken, fried fish, and refried beans.  · Sausage and lunch meats tend to be high in fat and salt. Buy low-fat, low-sodium versions.  One small change  Make a meal that includes a non-meat source of protein (such as tofu, lentils, or any other food listed above). Have a better idea? Write it here:  _____________________________________________________________  Date Last Reviewed: 6/25/2015  © 9814-7190 [employer] [address] [place] [address], [place] [address]. All  "rights reserved. This information is not intended as a substitute for professional medical care. Always follow your healthcare professional's instructions.             Your Current Medications Are     cefadroxil (DURICEF) 500 MG Cap    dexamethasone (DECADRON) 4 MG Tab    granisetron (SANCUSO) 3.1 mg/24 hour    hydrocodone-acetaminophen 10-325mg (NORCO)  mg Tab    lidocaine-prilocaine (EMLA) cream    lorazepam (ATIVAN) 0.5 MG tablet    mupirocin calcium 2% nasl oint (BACTROBAN) 2 % Oint    netupitant-palonosetron 300-0.5 mg Cap    ondansetron (ZOFRAN-ODT) 8 MG TbDL    prochlorperazine (COMPAZINE) 10 MG tablet    tramadol (ULTRAM) 50 mg tablet      Facility-Administered Medications     cyclophosphamide (CYTOXAN) 600 mg/m2 = 980 mg in sodium chloride 0.9% 250 mL chemo infusion    docetaxel (TAXOTERE) 75 mg/m2 = 120 mg in sodium chloride 0.9% 250 mL chemo infusion    fosaprepitant 150 mg in sodium chloride 0.9% 150 mL IVPB    palonosetron 0.25mg/dexamethasone 10mg IVPB    pegfilgrastim (NEULASTA (ON BODY INJECTOR)) injection 6 mg    sodium chloride 0.9% 250 mL flush bag    sodium chloride 0.9% flush 10 mL      Appointments for Next Year     7/27/2017  3:30 PM INFUSION 060 MIN (60 min.) [facility] CHAIR 15, [facility]    Arrive at check-in approximately 15 minutes before your scheduled appointment time. Bring all outside medical records and imaging, along with a list of your current medications and insurance card.    1st Floor         Default Flowsheet Data (last 24 hours)      Amb Complex Vitals Bebeto        05/02/17 0947                Measurements    Weight 60.7 kg (133 lb 14.4 oz)        Height 5' 4" (1.626 m)        BSA (Calculated - sq m) 1.66 sq meters        BMI (Calculated) 23                Allergies     No Known Allergies      Medications You Received from 05/01/2017 1048 to 05/02/2017 1048        Date/Time Order Dose Route Action     05/02/2017 1043 fosaprepitant 150 mg in sodium " chloride 0.9% 150 mL IVPB 150 mg Intravenous New Bag     05/02/2017 1043 sodium chloride 0.9% 250 mL flush bag   Intravenous New Bag      Current Discharge Medication List     Cannot display discharge medications since this is not an admission.

## 2018-04-10 ENCOUNTER — LAB VISIT (OUTPATIENT)
Dept: LAB | Facility: HOSPITAL | Age: 58
End: 2018-04-10
Attending: INTERNAL MEDICINE
Payer: COMMERCIAL

## 2018-04-10 DIAGNOSIS — D50.9 IRON DEFICIENCY ANEMIA, UNSPECIFIED IRON DEFICIENCY ANEMIA TYPE: ICD-10-CM

## 2018-04-10 LAB
FERRITIN SERPL-MCNC: 61 NG/ML
IRON SATN MFR SERPL: 20 %
IRON SERPL-MCNC: 61 UG/DL
TOTAL IRON BINDING CAPACITY: 302 UG/DL

## 2018-04-10 PROCEDURE — 82728 ASSAY OF FERRITIN: CPT

## 2018-04-10 PROCEDURE — 83540 ASSAY OF IRON: CPT | Mod: PN

## 2018-04-10 PROCEDURE — 83540 ASSAY OF IRON: CPT

## 2018-04-10 PROCEDURE — 36415 COLL VENOUS BLD VENIPUNCTURE: CPT | Mod: PN

## 2018-04-10 PROCEDURE — 82728 ASSAY OF FERRITIN: CPT | Mod: PN

## 2018-05-08 RX ORDER — HEPARIN 100 UNIT/ML
500 SYRINGE INTRAVENOUS
Status: CANCELLED | OUTPATIENT
Start: 2018-05-11

## 2018-05-08 RX ORDER — SODIUM CHLORIDE 0.9 % (FLUSH) 0.9 %
10 SYRINGE (ML) INJECTION
Status: CANCELLED | OUTPATIENT
Start: 2018-05-11

## 2018-07-10 PROBLEM — R60.0 PEDAL EDEMA: Status: ACTIVE | Noted: 2018-07-10

## 2020-06-10 ENCOUNTER — LAB VISIT (OUTPATIENT)
Dept: LAB | Facility: HOSPITAL | Age: 60
End: 2020-06-10
Attending: INTERNAL MEDICINE
Payer: COMMERCIAL

## 2020-06-10 DIAGNOSIS — R74.8 ELEVATED LIVER ENZYMES: ICD-10-CM

## 2020-06-10 LAB
ALBUMIN SERPL BCP-MCNC: 4.7 G/DL (ref 3.5–5.2)
ALP SERPL-CCNC: 77 U/L (ref 38–145)
ALT SERPL W/O P-5'-P-CCNC: 26 U/L (ref 0–35)
ANION GAP SERPL CALC-SCNC: 6 MMOL/L (ref 8–16)
AST SERPL-CCNC: 33 U/L (ref 14–36)
BILIRUB SERPL-MCNC: 0.2 MG/DL (ref 0.2–1.3)
BUN SERPL-MCNC: 15 MG/DL (ref 7–18)
CALCIUM SERPL-MCNC: 9.7 MG/DL (ref 8.4–10.2)
CHLORIDE SERPL-SCNC: 103 MMOL/L (ref 95–110)
CO2 SERPL-SCNC: 29 MMOL/L (ref 22–31)
CREAT SERPL-MCNC: 0.79 MG/DL (ref 0.5–1.4)
EST. GFR  (AFRICAN AMERICAN): >60 ML/MIN/1.73 M^2
EST. GFR  (NON AFRICAN AMERICAN): >60 ML/MIN/1.73 M^2
GLUCOSE SERPL-MCNC: 99 MG/DL (ref 70–110)
POTASSIUM SERPL-SCNC: 4.1 MMOL/L (ref 3.5–5.1)
PROT SERPL-MCNC: 7.6 G/DL (ref 6–8.4)
SODIUM SERPL-SCNC: 138 MMOL/L (ref 136–145)

## 2020-06-10 PROCEDURE — 80053 COMPREHEN METABOLIC PANEL: CPT | Mod: PN

## 2020-06-10 PROCEDURE — 36415 COLL VENOUS BLD VENIPUNCTURE: CPT | Mod: PN

## 2020-06-10 PROCEDURE — 80053 COMPREHEN METABOLIC PANEL: CPT

## 2020-08-03 ENCOUNTER — OFFICE VISIT (OUTPATIENT)
Dept: OTOLARYNGOLOGY | Facility: CLINIC | Age: 60
End: 2020-08-03
Payer: COMMERCIAL

## 2020-08-03 VITALS — WEIGHT: 127.63 LBS | BODY MASS INDEX: 21.79 KG/M2 | HEIGHT: 64 IN

## 2020-08-03 DIAGNOSIS — K13.70 LESION OF ORAL MUCOSA: ICD-10-CM

## 2020-08-03 PROCEDURE — 99999 PR PBB SHADOW E&M-EST. PATIENT-LVL III: CPT | Mod: PBBFAC,,, | Performed by: NURSE PRACTITIONER

## 2020-08-03 PROCEDURE — 3008F PR BODY MASS INDEX (BMI) DOCUMENTED: ICD-10-PCS | Mod: CPTII,S$GLB,, | Performed by: NURSE PRACTITIONER

## 2020-08-03 PROCEDURE — 3008F BODY MASS INDEX DOCD: CPT | Mod: CPTII,S$GLB,, | Performed by: NURSE PRACTITIONER

## 2020-08-03 PROCEDURE — 99203 PR OFFICE/OUTPT VISIT, NEW, LEVL III, 30-44 MIN: ICD-10-PCS | Mod: S$GLB,,, | Performed by: NURSE PRACTITIONER

## 2020-08-03 PROCEDURE — 99999 PR PBB SHADOW E&M-EST. PATIENT-LVL III: ICD-10-PCS | Mod: PBBFAC,,, | Performed by: NURSE PRACTITIONER

## 2020-08-03 PROCEDURE — 99203 OFFICE O/P NEW LOW 30 MIN: CPT | Mod: S$GLB,,, | Performed by: NURSE PRACTITIONER

## 2020-08-03 NOTE — PROGRESS NOTES
Subjective:       Patient ID: Micheline Dowling is a 60 y.o. female.    Chief Complaint: No chief complaint on file.    HPI   Patient is new to ENT, referred by GERRY Wood for consultation for throat lesion. Patient was seen by dentist on 7/27/20, at which time dental hygienist noted area of trauma in her throat. She then began to notice slight tenderness with drinking hot coffee or eating spicy Mexican food. GERRY Wood noted erythematous lesion on soft palate 4 days ago and referred her here. It seems to be gone now as she has been able to drink coffee without any issue. She is a non-smoker. Rare EtOH.     Review of Systems   Constitutional: Negative.    HENT: Negative.    Eyes: Negative.    Respiratory: Negative.    Cardiovascular: Negative.    Gastrointestinal: Negative.    Musculoskeletal: Negative.    Integumentary:  Negative.   Neurological: Negative.    Hematological: Negative.    Psychiatric/Behavioral: Negative.          Objective:      Physical Exam  Vitals signs and nursing note reviewed.   Constitutional:       General: She is not in acute distress.     Appearance: She is well-developed. She is not ill-appearing or diaphoretic.   HENT:      Head: Normocephalic and atraumatic.      Right Ear: Hearing, tympanic membrane, ear canal and external ear normal. No middle ear effusion. Tympanic membrane is not erythematous.      Left Ear: Hearing, tympanic membrane, ear canal and external ear normal.  No middle ear effusion. Tympanic membrane is not erythematous.      Nose: Nose normal. No mucosal edema or rhinorrhea.      Mouth/Throat:      Mouth: Mucous membranes are not pale, not dry and not cyanotic. No oral lesions.      Pharynx: Uvula midline. No oropharyngeal exudate or posterior oropharyngeal erythema.      Tonsils: 2+ on the right. 2+ on the left.   Eyes:      General: Lids are normal. No scleral icterus.        Right eye: No discharge.         Left eye: No discharge.      Conjunctiva/sclera: Conjunctivae  normal.      Pupils: Pupils are equal, round, and reactive to light.   Neck:      Musculoskeletal: Normal range of motion and neck supple.      Thyroid: No thyroid mass or thyromegaly.      Trachea: Trachea normal. No tracheal deviation.   Cardiovascular:      Rate and Rhythm: Normal rate.   Pulmonary:      Effort: Pulmonary effort is normal. No respiratory distress.      Breath sounds: No stridor. No wheezing.   Musculoskeletal: Normal range of motion.   Lymphadenopathy:      Head:      Right side of head: No submental, submandibular, tonsillar, preauricular or posterior auricular adenopathy.      Left side of head: No submental, submandibular, tonsillar, preauricular or posterior auricular adenopathy.      Cervical: No cervical adenopathy.      Right cervical: No superficial or posterior cervical adenopathy.     Left cervical: No superficial or posterior cervical adenopathy.   Skin:     General: Skin is warm and dry.      Coloration: Skin is not pale.      Findings: No lesion or rash.   Neurological:      Mental Status: She is alert and oriented to person, place, and time.      Coordination: Coordination normal.      Gait: Gait normal.   Psychiatric:         Speech: Speech normal.         Behavior: Behavior normal. Behavior is cooperative.         Thought Content: Thought content normal.         Judgment: Judgment normal.         Assessment:       1. Lesion of oral mucosa        Plan:     Reassurance -- nothing noted on oral examination today    Encouraged patient to send me a picture via [employer] of any concerning oral lesions  Return to clinic as needed for any ENT concerns.

## 2020-08-03 NOTE — LETTER
August 3, 2020      Bushra Wood, NP-C  [address] [facility] [address] [facility]  [address]  [facility] [address]           [facility]  [address] [place] [address]  Phone: [phone]  Fax: [phone]          Patient: Micheline Dowling   MR Number: [id number]   YOB: 1960   Date of Visit: 8/3/2020       Dear Bushra Wood:    Thank you for referring Micheline Dowling to me for evaluation. Attached you will find relevant portions of my assessment and plan of care.    If you have questions, please do not hesitate to call me. I look forward to following Micheline Dowling along with you.    Sincerely,    Kely Bansal, GABRIELLE    Enclosure  CC:  No Recipients    If you would like to receive this communication electronically, please contact [email or web address] or [phone] to request more information on [employer] Link access.    For providers and/or their staff who would like to refer a patient to [facility], please contact us through our one-stop-shop provider referral line, [facility] , at [phone].    If you feel you have received this communication in error or would no longer like to receive these types of communications, please e-mail [email or web address]

## 2021-05-10 ENCOUNTER — PATIENT MESSAGE (OUTPATIENT)
Dept: RESEARCH | Facility: HOSPITAL | Age: 61
End: 2021-05-10

## 2022-03-14 NOTE — PLAN OF CARE
Problem: Chemotherapy Effects (Adult)  Goal: Signs and Symptoms of Listed Potential Problems Will be Absent, Minimized or Managed (Chemotherapy Effects)  Signs and symptoms of listed potential problems will be absent, minimized or managed by discharge/transition of care (reference Chemotherapy Effects (Adult) CPG).   Outcome: Ongoing (interventions implemented as appropriate)  C3 D1 Taxotere/ Cytoxan/ OBI       Retention Suture Bite Size: 3 mm

## 2023-11-17 PROBLEM — Z00.00 PREVENTATIVE HEALTH CARE: Status: ACTIVE | Noted: 2023-11-17

## 2023-11-17 PROBLEM — E78.00 HYPERCHOLESTEROLEMIA: Status: ACTIVE | Noted: 2023-11-17

## 2023-11-17 PROBLEM — R73.9 HYPERGLYCEMIA: Status: ACTIVE | Noted: 2023-11-17

## 2024-02-19 PROBLEM — Z00.00 PREVENTATIVE HEALTH CARE: Status: RESOLVED | Noted: 2023-11-17 | Resolved: 2024-02-19
